# Patient Record
Sex: MALE | Race: WHITE | NOT HISPANIC OR LATINO | Employment: FULL TIME
[De-identification: names, ages, dates, MRNs, and addresses within clinical notes are randomized per-mention and may not be internally consistent; named-entity substitution may affect disease eponyms.]

---
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
## 2021-11-24 ENCOUNTER — LAB REQUISITION (OUTPATIENT)
Dept: LAB | Facility: CLINIC | Age: 21
End: 2021-11-24
Payer: COMMERCIAL

## 2021-11-24 DIAGNOSIS — Z20.822 CONTACT WITH AND (SUSPECTED) EXPOSURE TO COVID-19: ICD-10-CM

## 2021-11-24 PROCEDURE — U0003 INFECTIOUS AGENT DETECTION BY NUCLEIC ACID (DNA OR RNA); SEVERE ACUTE RESPIRATORY SYNDROME CORONAVIRUS 2 (SARS-COV-2) (CORONAVIRUS DISEASE [COVID-19]), AMPLIFIED PROBE TECHNIQUE, MAKING USE OF HIGH THROUGHPUT TECHNOLOGIES AS DESCRIBED BY CMS-2020-01-R: HCPCS | Mod: ORL

## 2021-11-25 LAB — SARS-COV-2 RNA RESP QL NAA+PROBE: NEGATIVE

## 2024-09-04 ENCOUNTER — HOSPITAL ENCOUNTER (EMERGENCY)
Facility: HOSPITAL | Age: 24
Discharge: HOME | End: 2024-09-04
Payer: COMMERCIAL

## 2024-09-04 ENCOUNTER — APPOINTMENT (OUTPATIENT)
Dept: RADIOLOGY | Facility: HOSPITAL | Age: 24
End: 2024-09-04
Payer: COMMERCIAL

## 2024-09-04 VITALS
RESPIRATION RATE: 18 BRPM | SYSTOLIC BLOOD PRESSURE: 122 MMHG | OXYGEN SATURATION: 100 % | WEIGHT: 167 LBS | HEART RATE: 70 BPM | BODY MASS INDEX: 22.62 KG/M2 | HEIGHT: 72 IN | TEMPERATURE: 97.7 F | DIASTOLIC BLOOD PRESSURE: 80 MMHG

## 2024-09-04 DIAGNOSIS — S61.411A LACERATION OF RIGHT HAND WITHOUT FOREIGN BODY, INITIAL ENCOUNTER: ICD-10-CM

## 2024-09-04 PROCEDURE — 250N000009 HC RX 250

## 2024-09-04 PROCEDURE — 99283 EMERGENCY DEPT VISIT LOW MDM: CPT

## 2024-09-04 PROCEDURE — 73130 X-RAY EXAM OF HAND: CPT | Mod: RT

## 2024-09-04 PROCEDURE — 12002 RPR S/N/AX/GEN/TRNK2.6-7.5CM: CPT

## 2024-09-04 RX ORDER — GINSENG 100 MG
CAPSULE ORAL ONCE
Status: COMPLETED | OUTPATIENT
Start: 2024-09-05 | End: 2024-09-04

## 2024-09-04 RX ADMIN — BACITRACIN: 500 OINTMENT TOPICAL at 23:51

## 2024-09-04 ASSESSMENT — COLUMBIA-SUICIDE SEVERITY RATING SCALE - C-SSRS
2. HAVE YOU ACTUALLY HAD ANY THOUGHTS OF KILLING YOURSELF IN THE PAST MONTH?: NO
1. IN THE PAST MONTH, HAVE YOU WISHED YOU WERE DEAD OR WISHED YOU COULD GO TO SLEEP AND NOT WAKE UP?: NO
6. HAVE YOU EVER DONE ANYTHING, STARTED TO DO ANYTHING, OR PREPARED TO DO ANYTHING TO END YOUR LIFE?: NO

## 2024-09-04 ASSESSMENT — ACTIVITIES OF DAILY LIVING (ADL): ADLS_ACUITY_SCORE: 35

## 2024-09-05 NOTE — ED TRIAGE NOTES
Right hand laceration after catching a softball. Bleeding controlled.    Triage Assessment (Adult)       Row Name 09/04/24 2223          Triage Assessment    Airway WDL WDL        Respiratory WDL    Respiratory WDL WDL        Cardiac WDL    Cardiac WDL WDL        Peripheral/Neurovascular WDL    Peripheral Neurovascular WDL WDL

## 2024-09-05 NOTE — DISCHARGE INSTRUCTIONS
You were seen in the Emergency Department for a laceration. This was closed with 7 stitches.  Your tetanus was updated today.     You will need to have the stitches removed in 10-14 days as your stitches are located on your palms and/or soles. You can either go to your primary clinic or return here to the emergency department to have this done.        Instructions for caring for your laceration repair at home:    Keep the wound covered with the bandage/dressing we applied here in the emergency department for the next 24 hours.   After the bandage is removed, you can keep the wound open to air ideally with an antibiotic ointment applied to the wound.   Please apply an antibiotic ointment such as bacitracin, polymixin B, or neosporin twice a day every day until your stitches are removed to reduce risk of infection.   If you have a history of skin sensitivity to neosporin, please do not apply neosporin to your wound.   You may shower and let soapy water run over the wound after the first 24 hours, but do not scrub the wound.   Avoid swimming in hot tubs, pools, lakes or rivers until after your stitches are removed.  Scars take one year to fully heal. After the stitches are removed, please use sunscreen on the scar for the next 12 months to improve healing and final appearance of the scar.    Stitch removal: Palms and soles: 10-14 days.     Please return to the emergency department if you develop a fever, pus draining from the wound, red streaking around the wound, increasing pain, swelling around the wound, or any other new or concerning symptoms, otherwise you can follow up with your primary care provider.

## 2024-09-05 NOTE — ED PROVIDER NOTES
Emergency Department Encounter   NAME: Benitez Lee  AGE: 23 year old male  YOB: 2000  MRN: [id number]    PCP: Dylon Gaitan  ED PROVIDER: Mirela Davis PA-C    Evaluation Date & Time:   9/4/2024 10:25 PM    CHIEF COMPLAINT:  Laceration      Impression and Plan   MDM: 23-year-old male with no pertinent history presents for evaluation of right hand laceration.  Went to catch a ground ball in softball and the softball cut his right hand in the webspace of the right second and third digits.  He has no distal numbness or tingling.  No difficulty moving the hand.  Last tetanus was in 2021.  On arrival here patient is slightly hypertensive at 141/72, but otherwise vitally stable.  Afebrile.  On examination patient is in no acute distress.  He has a 4 cm linear hemostatic laceration in between the right second and third digit webspace.  Full depth of the wound is visualized without any foreign body, bone, tendon, or muscle.  He has full range of motion of the hand.  Hand is neurovascularly intact.  He has some mild tenderness to palpation of the palmar aspect of the third metacarpal.  Because of this, we discussed obtaining x-ray to assess for acute fracture.  Low suspicion for tendinous injury full range of motion.     X-ray per my independent interpretation without any acute fracture or dislocation.  No radiopaque foreign body.  Supported by radiology read.  Discussed results with patient.  Plan for closure with sutures.  Anesthetized the wound and wound was thoroughly irrigated with normal saline.  Full depth of the wound was visualized without any foreign body, bone, tendon, or muscle.  Laceration was closed with 7 simple interrupted 5-0 Ethilon sutures.  We discussed wound care, suture care, suture removal timeline.  We discussed signs of infection.  Patient will see his primary care provider in 10 to 14 days to get these removed.  Wound was dressed with bacitracin and a bandage.  Hypertension has  resolved.  We reviewed strict return precautions and patient was discharged home in stable condition.    ED Course as of 09/05/24 0000   Wed Sep 04, 2024   7815 I met with the patient, obtained history, performed an initial exam, and discussed options and plan for diagnostics and treatment here in the ED.     1404 BP: 122/80  Improved        Medical Decision Making  Obtained supplemental history:Supplemental history obtained?: No  Reviewed external records: External records reviewed?: No  Care impacted by chronic illness:N/A  Care significantly affected by social determinants of health:N/A  Did you consider but not order tests?: Work up considered but not performed and documented in chart, if applicable  Did you interpret images independently?: Independent interpretation of ECG and images noted in documentation, when applicable.  Consultation discussion with other provider:Did you involve another provider (consultant, , pharmacy, etc.)?: No  Discharge. No recommendations on prescription strength medication(s). N/A.   At the conclusion of the encounter I discussed the results of all the tests and the disposition. The questions were answered. The patient or family acknowledged understanding and was agreeable with the care plan.         FINAL IMPRESSION:    ICD-10-CM    1. Laceration of right hand without foreign body, initial encounter  S61.411A             MEDICATIONS GIVEN IN THE EMERGENCY DEPARTMENT:  Medications   bacitracin ointment ( Topical $Given 9/4/24 7458)         NEW PRESCRIPTIONS STARTED AT TODAY'S ED VISIT:  There are no discharge medications for this patient.        HPI   Patient information was obtained from: Patient   Use of Intrepreter: N/A     Benitez Lee is a 23 year old male with no pertinent history who presents to the ED  for evaluation of a laceration.     Patient reports he was playing softball today (9/04/24) and thinks he got grazed by the ball. He sustained a deep laceration to the  webbed space between right 2nd (index) and 3rd (middle) digits.     He denied any numbness or tingliness.  He has no difficulty moving his hand.  Per EMR last tetanus was in 2021. No other complaints at this time.      REVIEW OF SYSTEMS:  Pertinent positive and negative symptoms per HPI.       Medical History     History reviewed. No pertinent past medical history.    History reviewed. No pertinent surgical history.    No family history on file.         No current outpatient medications on file.        Physical Exam     First Vitals:  Patient Vitals for the past 24 hrs:   BP Temp Temp src Pulse Resp SpO2 Height Weight   09/04/24 2356 122/80 -- -- 70 18 100 % -- --   09/04/24 2222 (!) 141/72 97.7  F (36.5  C) Temporal 78 18 100 % 1.829 m (6') 75.8 kg (167 lb)       PHYSICAL EXAM:   General Appearance:  Alert, cooperative, no distress, appears stated age  Musculoskeletal: Moving all extremities. No gross deformities. Right hand: Full range of motion of all fingers.  No tenderness to palpation of index or long finger.  No tenderness to palpation of index metacarpal.  Mild tenderness to palpation over the palmar aspect of the middle metacarpal.  Brisk cap refill.  2+ radial pulse.  Integument: Warm, dry, no rashes.  4 cm hemostatic laceration in the right index and ring finger webspace extending onto the palm.  Full depth of the wound was visualized without any foreign body, tendon, bone, or muscle.  Surrounding sensation is intact to light touch.    Neurologic: Alert and orientated x3.   Psych: Normal mood and affect      Results     LAB:  All pertinent labs reviewed and interpreted  Labs Ordered and Resulted from Time of ED Arrival to Time of ED Departure - No data to display    RADIOLOGY:  XR Hand Right G/E 3 Views   Final Result   IMPRESSION: Normal joint spaces and alignment. No fracture. No radiopaque foreign body.            PROCEDURES:  PROCEDURE: Laceration Repair   INDICATIONS: Laceration   PROCEDURE PROVIDER:  Mirela Davis PA-C   SITE: Right hand   TYPE/SIZE: simple, clean, and no foreign body visualized  4 cm (total length)   FUNCTIONAL ASSESSMENT: Distal sensation, circulation, motor, and tendon function intact   MEDICATION: 4 mLs of 1% Lidocaine with epinephrine   PREPARATION: irrigation with Normal saline   DEBRIDEMENT: no debridement and wound explored, no foreign body found   CLOSURE:  Superficial layer closed with 7 stitches of 5-0 Ethilon simple interrupted    Total number of sutures/staples placed: 7         I, Dangelo Johnson , am serving as a scribe to document services personally performed by Mirela Davis PA-C, based on my observation and the provider's statements to me. IMirela PA-C attest that Dangelo Johnson  is acting in a scribe capacity, has observed my performance of the services and has documented them in accordance with my direction.       Mirela Davis PA-C   Emergency Medicine   [facility] EMERGENCY DEPARTMENT       Mirela Davis PA-C  09/05/24 0000